# Patient Record
Sex: MALE | Race: WHITE | HISPANIC OR LATINO | Employment: OTHER | URBAN - METROPOLITAN AREA
[De-identification: names, ages, dates, MRNs, and addresses within clinical notes are randomized per-mention and may not be internally consistent; named-entity substitution may affect disease eponyms.]

---

## 2018-09-14 ENCOUNTER — APPOINTMENT (OUTPATIENT)
Dept: RADIOLOGY | Facility: MEDICAL CENTER | Age: 78
End: 2018-09-14
Attending: EMERGENCY MEDICINE
Payer: MEDICARE

## 2018-09-14 ENCOUNTER — HOSPITAL ENCOUNTER (EMERGENCY)
Facility: MEDICAL CENTER | Age: 78
End: 2018-09-14
Attending: EMERGENCY MEDICINE
Payer: MEDICARE

## 2018-09-14 VITALS
HEIGHT: 66 IN | HEART RATE: 80 BPM | SYSTOLIC BLOOD PRESSURE: 159 MMHG | BODY MASS INDEX: 24.66 KG/M2 | DIASTOLIC BLOOD PRESSURE: 98 MMHG | OXYGEN SATURATION: 97 % | TEMPERATURE: 98.3 F | RESPIRATION RATE: 16 BRPM | WEIGHT: 153.44 LBS

## 2018-09-14 DIAGNOSIS — K62.89 RECTAL PAIN: ICD-10-CM

## 2018-09-14 DIAGNOSIS — R10.32 LLQ ABDOMINAL PAIN: ICD-10-CM

## 2018-09-14 LAB
ANION GAP SERPL CALC-SCNC: 7 MMOL/L (ref 0–11.9)
APPEARANCE UR: CLEAR
BACTERIA #/AREA URNS HPF: NEGATIVE /HPF
BASOPHILS # BLD AUTO: 0.9 % (ref 0–1.8)
BASOPHILS # BLD: 0.06 K/UL (ref 0–0.12)
BILIRUB UR QL STRIP.AUTO: NEGATIVE
BUN SERPL-MCNC: 17 MG/DL (ref 8–22)
CALCIUM SERPL-MCNC: 9.3 MG/DL (ref 8.5–10.5)
CHLORIDE SERPL-SCNC: 104 MMOL/L (ref 96–112)
CO2 SERPL-SCNC: 25 MMOL/L (ref 20–33)
COLOR UR: YELLOW
CREAT SERPL-MCNC: 1.03 MG/DL (ref 0.5–1.4)
EOSINOPHIL # BLD AUTO: 0.3 K/UL (ref 0–0.51)
EOSINOPHIL NFR BLD: 4.5 % (ref 0–6.9)
EPI CELLS #/AREA URNS HPF: NEGATIVE /HPF
ERYTHROCYTE [DISTWIDTH] IN BLOOD BY AUTOMATED COUNT: 40.6 FL (ref 35.9–50)
GLUCOSE SERPL-MCNC: 87 MG/DL (ref 65–99)
GLUCOSE UR STRIP.AUTO-MCNC: NEGATIVE MG/DL
HCT VFR BLD AUTO: 45.3 % (ref 42–52)
HGB BLD-MCNC: 15.3 G/DL (ref 14–18)
HYALINE CASTS #/AREA URNS LPF: ABNORMAL /LPF
IMM GRANULOCYTES # BLD AUTO: 0.03 K/UL (ref 0–0.11)
IMM GRANULOCYTES NFR BLD AUTO: 0.4 % (ref 0–0.9)
KETONES UR STRIP.AUTO-MCNC: NEGATIVE MG/DL
LEUKOCYTE ESTERASE UR QL STRIP.AUTO: NEGATIVE
LYMPHOCYTES # BLD AUTO: 1.34 K/UL (ref 1–4.8)
LYMPHOCYTES NFR BLD: 20 % (ref 22–41)
MCH RBC QN AUTO: 29.3 PG (ref 27–33)
MCHC RBC AUTO-ENTMCNC: 33.8 G/DL (ref 33.7–35.3)
MCV RBC AUTO: 86.6 FL (ref 81.4–97.8)
MICRO URNS: ABNORMAL
MONOCYTES # BLD AUTO: 0.85 K/UL (ref 0–0.85)
MONOCYTES NFR BLD AUTO: 12.7 % (ref 0–13.4)
NEUTROPHILS # BLD AUTO: 4.12 K/UL (ref 1.82–7.42)
NEUTROPHILS NFR BLD: 61.5 % (ref 44–72)
NITRITE UR QL STRIP.AUTO: NEGATIVE
NRBC # BLD AUTO: 0 K/UL
NRBC BLD-RTO: 0 /100 WBC
PH UR STRIP.AUTO: 6 [PH]
PLATELET # BLD AUTO: 308 K/UL (ref 164–446)
PMV BLD AUTO: 9.2 FL (ref 9–12.9)
POTASSIUM SERPL-SCNC: 3.9 MMOL/L (ref 3.6–5.5)
PROT UR QL STRIP: 30 MG/DL
RBC # BLD AUTO: 5.23 M/UL (ref 4.7–6.1)
RBC # URNS HPF: ABNORMAL /HPF
RBC UR QL AUTO: NEGATIVE
SODIUM SERPL-SCNC: 136 MMOL/L (ref 135–145)
SP GR UR STRIP.AUTO: 1.01
UROBILINOGEN UR STRIP.AUTO-MCNC: 0.2 MG/DL
WBC # BLD AUTO: 6.7 K/UL (ref 4.8–10.8)
WBC #/AREA URNS HPF: ABNORMAL /HPF

## 2018-09-14 PROCEDURE — 96375 TX/PRO/DX INJ NEW DRUG ADDON: CPT

## 2018-09-14 PROCEDURE — 99285 EMERGENCY DEPT VISIT HI MDM: CPT

## 2018-09-14 PROCEDURE — 81001 URINALYSIS AUTO W/SCOPE: CPT

## 2018-09-14 PROCEDURE — 96374 THER/PROPH/DIAG INJ IV PUSH: CPT | Mod: XU

## 2018-09-14 PROCEDURE — 80048 BASIC METABOLIC PNL TOTAL CA: CPT

## 2018-09-14 PROCEDURE — 700117 HCHG RX CONTRAST REV CODE 255: Performed by: EMERGENCY MEDICINE

## 2018-09-14 PROCEDURE — 36415 COLL VENOUS BLD VENIPUNCTURE: CPT

## 2018-09-14 PROCEDURE — 85025 COMPLETE CBC W/AUTO DIFF WBC: CPT

## 2018-09-14 PROCEDURE — 700111 HCHG RX REV CODE 636 W/ 250 OVERRIDE (IP): Performed by: EMERGENCY MEDICINE

## 2018-09-14 PROCEDURE — 74177 CT ABD & PELVIS W/CONTRAST: CPT

## 2018-09-14 RX ORDER — HYDROCODONE BITARTRATE AND ACETAMINOPHEN 5; 325 MG/1; MG/1
1-2 TABLET ORAL EVERY 4 HOURS PRN
Qty: 10 TAB | Refills: 0 | Status: SHIPPED | OUTPATIENT
Start: 2018-09-14 | End: 2018-09-16

## 2018-09-14 RX ORDER — ONDANSETRON 2 MG/ML
4 INJECTION INTRAMUSCULAR; INTRAVENOUS
Status: DISCONTINUED | OUTPATIENT
Start: 2018-09-14 | End: 2018-09-14 | Stop reason: HOSPADM

## 2018-09-14 RX ORDER — LISINOPRIL 10 MG/1
10 TABLET ORAL DAILY
COMMUNITY
End: 2018-09-25

## 2018-09-14 RX ORDER — MORPHINE SULFATE 4 MG/ML
4 INJECTION, SOLUTION INTRAMUSCULAR; INTRAVENOUS ONCE
Status: COMPLETED | OUTPATIENT
Start: 2018-09-14 | End: 2018-09-14

## 2018-09-14 RX ORDER — POLYETHYLENE GLYCOL 3350 17 G/17G
17 POWDER, FOR SOLUTION ORAL DAILY
Qty: 1 BOTTLE | Refills: 0 | Status: SHIPPED | OUTPATIENT
Start: 2018-09-14

## 2018-09-14 RX ORDER — ALLOPURINOL 100 MG/1
100 TABLET ORAL DAILY
COMMUNITY

## 2018-09-14 RX ADMIN — ONDANSETRON 4 MG: 2 INJECTION INTRAMUSCULAR; INTRAVENOUS at 17:25

## 2018-09-14 RX ADMIN — IOHEXOL 100 ML: 350 INJECTION, SOLUTION INTRAVENOUS at 19:24

## 2018-09-14 RX ADMIN — MORPHINE SULFATE 4 MG: 4 INJECTION INTRAVENOUS at 17:25

## 2018-09-14 ASSESSMENT — PAIN SCALES - GENERAL
PAINLEVEL_OUTOF10: 4
PAINLEVEL_OUTOF10: 0

## 2018-09-14 NOTE — ED PROVIDER NOTES
"ED Provider Note    CHIEF COMPLAINT  Chief Complaint   Patient presents with   • Rectal Pain   • Groin Pain       HPI  Martinez Patrick is a 77 y.o. male who presents for 2-3 days of rectal pain that he feels on the perineum.  Pain is variable from mild to severe.  He also has some mild left groin pain and mild epigastric pain.  No fever dysuria urgency or frequency.  No diabetes or immune compromise.  History of prostatism and urinary retention perhaps status post a TURP.  Denies urinary retention now.     REVIEW OF SYSTEMS  Pertinent positives include: Rectal or perineal pain, left groin pain, epigastric pain.  Pertinent negatives include: Fever, nausea, vomiting, diarrhea, constipation, hernia, abscess.  10+ systems reviewed and negative    PAST MEDICAL HISTORY  Past Medical History:   Diagnosis Date   • Gout    • Hypertension        SOCIAL HISTORY  Here with wife and son.    CURRENT MEDICATIONS  Home Medications     Reviewed by Melissa Rosas R.N. (Registered Nurse) on 09/14/18 at 1532  Med List Status: Complete   Medication Last Dose Status   allopurinol (ZYLOPRIM) 100 MG Tab  Active   lisinopril (PRINIVIL) 10 MG Tab  Active                ALLERGIES  No Known Allergies    PHYSICAL EXAM  VITAL SIGNS: BP (!) 176/108   Pulse 86   Temp 36.8 °C (98.3 °F) (Temporal)   Resp 16   Ht 1.676 m (5' 6\")   Wt 69.6 kg (153 lb 7 oz)   SpO2 96%   BMI 24.77 kg/m²   Constitutional :  Well developed, Well nourished, elevated blood pressure, afebrile, well-appearing.   HNT: Oropharynx moist.    Lymphatic: No inguinal adenopathy.   Cardiovascular: Regular rhythm, No murmurs, No rubs, No gallops.  No cyanosis.   Respiratory: No rales, rhonchi, wheeze  Abdomen:  Soft, nontender, minimal left groin tenderness without hernia, testicular tenderness nor epididymal tenderness.  No perianal mass or tenderness.  Large prostate, brown stool Hemoccult negative, no evidence of perirectal abscess  Skin: Warm, dry, no erythema, no rash. "   Musculoskeletal: no limb deformities.    RADIOLOGY:  CT-ABDOMEN-PELVIS WITH   Final Result         1. No acute abnormality in the abdomen or pelvis.   2. Colonic diverticulosis.   3. Small bilateral testicular hydroceles. Prostatomegaly.   4. Mild bilateral renal atrophy and nodularity of the renal cortices, likely related to chronic medical renal disease.          LABORATORY:  Results for orders placed or performed during the hospital encounter of 09/14/18   URINALYSIS,CULTURE IF INDICATED   Result Value Ref Range    Color Yellow     Character Clear     Specific Gravity 1.013 <1.035    Ph 6.0 5.0 - 8.0    Glucose Negative Negative mg/dL    Ketones Negative Negative mg/dL    Protein 30 (A) Negative mg/dL    Bilirubin Negative Negative    Urobilinogen, Urine 0.2 Negative    Nitrite Negative Negative    Leukocyte Esterase Negative Negative    Occult Blood Negative Negative    Micro Urine Req Microscopic    URINE MICROSCOPIC (W/UA)   Result Value Ref Range    WBC 0-2 (A) /hpf    RBC 2-5 (A) /hpf    Bacteria Negative None /hpf    Epithelial Cells Negative /hpf    Hyaline Cast 0-2 /lpf   CBC WITH DIFFERENTIAL   Result Value Ref Range    WBC 6.7 4.8 - 10.8 K/uL    RBC 5.23 4.70 - 6.10 M/uL    Hemoglobin 15.3 14.0 - 18.0 g/dL    Hematocrit 45.3 42.0 - 52.0 %    MCV 86.6 81.4 - 97.8 fL    MCH 29.3 27.0 - 33.0 pg    MCHC 33.8 33.7 - 35.3 g/dL    RDW 40.6 35.9 - 50.0 fL    Platelet Count 308 164 - 446 K/uL    MPV 9.2 9.0 - 12.9 fL    Neutrophils-Polys 61.50 44.00 - 72.00 %    Lymphocytes 20.00 (L) 22.00 - 41.00 %    Monocytes 12.70 0.00 - 13.40 %    Eosinophils 4.50 0.00 - 6.90 %    Basophils 0.90 0.00 - 1.80 %    Immature Granulocytes 0.40 0.00 - 0.90 %    Nucleated RBC 0.00 /100 WBC    Neutrophils (Absolute) 4.12 1.82 - 7.42 K/uL    Lymphs (Absolute) 1.34 1.00 - 4.80 K/uL    Monos (Absolute) 0.85 0.00 - 0.85 K/uL    Eos (Absolute) 0.30 0.00 - 0.51 K/uL    Baso (Absolute) 0.06 0.00 - 0.12 K/uL    Immature Granulocytes  (abs) 0.03 0.00 - 0.11 K/uL    NRBC (Absolute) 0.00 K/uL   BASIC METABOLIC PANEL   Result Value Ref Range    Sodium 136 135 - 145 mmol/L    Potassium 3.9 3.6 - 5.5 mmol/L    Chloride 104 96 - 112 mmol/L    Co2 25 20 - 33 mmol/L    Glucose 87 65 - 99 mg/dL    Bun 17 8 - 22 mg/dL    Creatinine 1.03 0.50 - 1.40 mg/dL    Calcium 9.3 8.5 - 10.5 mg/dL    Anion Gap 7.0 0.0 - 11.9         COURSE & MEDICAL DECISION MAKING  Well-appearing patient presents with left lower quadrant abdominal pain and perirectal or perineal pain.  There is no evidence of prostatitis, urinary retention, inguinal hernia, testicular pathology or diverticulitis although the patient does have diverticulosis.  He may have constipation.  There is no evidence of aortoiliac disease or mesenteric ischemia.  There is no perirectal or perianal abscess.    PLAN:  Discharge Medication List as of 9/14/2018  8:25 PM      START taking these medications    Details   polyethylene glycol 3350 (MIRALAX) Powder Take 17 g by mouth every day. Mixed in 1 cup water, Disp-1 Bottle, R-0, Print Rx Paper      HYDROcodone-acetaminophen (NORCO) 5-325 MG Tab per tablet Take 1-2 Tabs by mouth every four hours as needed (mild pain) for up to 2 days., Disp-10 Tab, R-0, Print Rx Paper, For 2 days           Prescription monitoring queried and score low  Opiate risk tool utilized and patient low risk  Informed consent obtained for opiate analgesic  Indication opiate analgesic pain and not controlled by ibuprofen    Abdominal pain handout given  Return for fever, worsening pain, GI bleed, ill appearance    [address] [address]  [phone]  Schedule an appointment as soon as possible for a visit in 1 week  As needed si no mejora        CONDITION:  Good.    FINAL IMPRESSION:  1. LLQ abdominal pain    2. Rectal pain          Electronically signed by: Dinh Bone, 9/14/2018

## 2018-09-14 NOTE — ED TRIAGE NOTES
Pt ambulatory to triage. Pt c/o pain near rectum., that is worse when seated. Pt also c/o left groin pain. Denies any blood with defecation. Sx began 3 pain. Pt denies lifting anything heavy or doing anything abnormal.    Chief Complaint   Patient presents with   • Rectal Pain   • Groin Pain     Pt placed in lobby, updated on triage process. Pt educated to notified RN or triage tech if changes in condition.

## 2018-09-15 NOTE — ED NOTES
D/c instructions and rxs reviewed with pt. Pt verbalized understanding. Pt in nad at time of d/c. Pt denies pain at time of d/c. Pt ambulatory out of department with family

## 2018-09-15 NOTE — DISCHARGE INSTRUCTIONS
Diverticulosis  (Diverticulosis)  No sabemos porque tiene dolor bita no hay senale de problema grave.  Leonela miralax si esta estrenido y para prevenir estrenimiento si leonela hydrocodone contra dolor.  Vaya a [facility] clinic si no mejora la semana que viene.  Regresa para fiebre, dolor roosevelt, o si parece enfermo.    You had a borderline or high normal blood pressure reading today.  This does not necessarily mean you have hypertension.  Please followup with your/a primary physician for comprehensive blood pressure evaluation and yearly fasting cholesterol assessment.  BP Readings from Last 3 Encounters:   09/14/18 (!) 176/108           La diverticulosis es christina enfermedad que aparece cuando se delma pequeños bolsillos (divertículos) en las carson del colon. El colon, o intestino grueso, es el lugar donde se absorbe agua y se delma las heces. Los bolsillos se delma cuando la capa interna del colon ejerce presión sobre los puntos débiles de las capas externas.  CAUSAS  Nadie sabe con exactitud qué causa la diverticulosis.  FACTORES DE RIESGO  · Ser mayor de 50 años. El riesgo de desarrollar esta enfermedad aumenta con la edad. La diverticulosis es poco frecuente en las personas menores de 40 años. A los 80 años, burke todas las personas tienen la enfermedad.  · [place] christina dieta con bajo contenido de fibra.  · Estar estreñido con frecuencia.  · Tener sobrepeso.  · No hacer suficiente ejercicio físico.  · Fumar.  · Tanya analgésicos de venta lilian, agnieszka aspirina e ibuprofeno.  SÍNTOMAS  La mayoría de las personas que tienen diverticulosis no presentan síntomas.  DIAGNÓSTICO  Dado que la diverticulosis no suele causar síntomas, los médicos a menudo descubren la enfermedad tammi un examen de otros problemas de colon. En muchos casos, el médico diagnosticará la diverticulosis mientras utiliza un endoscopio flexible para examinar el colon (colonoscopía).  TRATAMIENTO  Si nunca tuvo christina infección relacionada con la  diverticulosis, es posible que no necesite tratamiento. Si ha tenido christina infección antes, el tratamiento puede incluir:  · [place] más frutas, verduras y cereales.  · Tanya un suplemento de fibra.  · Tanya un suplemento de bacterias laly (probiótico).  · Tanya medicamentos para relajar el colon.  INSTRUCCIONES PARA EL CUIDADO EN EL HOGAR  · [place] por lo menos entre 6 y 8 vasos de agua por día para prevenir el estreñimiento.  · Trate de no hacer fuerza al  el intestino.  · Cumpla con todas las visitas de control.  Si ha tenido christina infección antes:  · Aumente la cantidad de fibra en la dieta, según las indicaciones del médico o del nutricionista.  · [place] un suplemento dietario con fibras si el médico lo autoriza.  · [place] los medicamentos solamente agnieszka se lo haya indicado el médico.  SOLICITE ATENCIÓN MÉDICA SI:  · Siente dolor abdominal.  · Tiene meteorismo.  · Tiene cólicos.  · No ha defecado en 3 susie.  SOLICITE ATENCIÓN MÉDICA DE INMEDIATO SI:  · El dolor empeora.  · El meteorismo empeora mucho.  · Tiene fiebre o escalofríos, y los síntomas empeoran repentinamente.  · Comienza a vomitar.  · La materia fecal es sanguinolenta o marysol.  ASEGÚRESE DE QUE:  · Comprende estas instrucciones.  · Controlará vicente afección.  · Recibirá ayuda de inmediato si no mejora o si empeora.  Esta información no tiene agnieszka fin reemplazar el consejo del médico. Asegúrese de hacerle al médico cualquier pregunta que tenga.  Document Released: 11/30/2009 Document Revised: 12/23/2014 Document Reviewed: 11/12/2014  Elsevier Interactive Patient Education © 2017 [employer]

## 2018-09-25 ENCOUNTER — HOSPITAL ENCOUNTER (EMERGENCY)
Facility: MEDICAL CENTER | Age: 78
End: 2018-09-25
Attending: EMERGENCY MEDICINE
Payer: MEDICARE

## 2018-09-25 ENCOUNTER — OFFICE VISIT (OUTPATIENT)
Dept: URGENT CARE | Facility: CLINIC | Age: 78
End: 2018-09-25
Payer: MEDICARE

## 2018-09-25 VITALS
DIASTOLIC BLOOD PRESSURE: 100 MMHG | BODY MASS INDEX: 25.61 KG/M2 | SYSTOLIC BLOOD PRESSURE: 202 MMHG | WEIGHT: 150 LBS | RESPIRATION RATE: 16 BRPM | HEIGHT: 64 IN | OXYGEN SATURATION: 96 % | TEMPERATURE: 98.5 F | HEART RATE: 63 BPM

## 2018-09-25 VITALS
BODY MASS INDEX: 25.93 KG/M2 | SYSTOLIC BLOOD PRESSURE: 169 MMHG | TEMPERATURE: 98.5 F | DIASTOLIC BLOOD PRESSURE: 92 MMHG | HEIGHT: 64 IN | HEART RATE: 64 BPM | WEIGHT: 151.9 LBS | RESPIRATION RATE: 16 BRPM | OXYGEN SATURATION: 98 %

## 2018-09-25 DIAGNOSIS — R10.32 LEFT LOWER QUADRANT PAIN: ICD-10-CM

## 2018-09-25 DIAGNOSIS — R10.32 LLQ ABDOMINAL PAIN: ICD-10-CM

## 2018-09-25 DIAGNOSIS — I10 MALIGNANT HYPERTENSION: ICD-10-CM

## 2018-09-25 DIAGNOSIS — I10 HYPERTENSION, UNSPECIFIED TYPE: ICD-10-CM

## 2018-09-25 LAB
ALBUMIN SERPL BCP-MCNC: 4.6 G/DL (ref 3.2–4.9)
ALBUMIN/GLOB SERPL: 1.3 G/DL
ALP SERPL-CCNC: 96 U/L (ref 30–99)
ALT SERPL-CCNC: 11 U/L (ref 2–50)
ANION GAP SERPL CALC-SCNC: 8 MMOL/L (ref 0–11.9)
APPEARANCE UR: CLEAR
AST SERPL-CCNC: 20 U/L (ref 12–45)
BACTERIA #/AREA URNS HPF: NEGATIVE /HPF
BASOPHILS # BLD AUTO: 0.7 % (ref 0–1.8)
BASOPHILS # BLD: 0.06 K/UL (ref 0–0.12)
BILIRUB SERPL-MCNC: 0.5 MG/DL (ref 0.1–1.5)
BILIRUB UR QL STRIP.AUTO: NEGATIVE
BUN SERPL-MCNC: 16 MG/DL (ref 8–22)
CALCIUM SERPL-MCNC: 10.3 MG/DL (ref 8.5–10.5)
CHLORIDE SERPL-SCNC: 106 MMOL/L (ref 96–112)
CO2 SERPL-SCNC: 26 MMOL/L (ref 20–33)
COLOR UR: YELLOW
CREAT SERPL-MCNC: 1.15 MG/DL (ref 0.5–1.4)
EKG IMPRESSION: NORMAL
EOSINOPHIL # BLD AUTO: 0.19 K/UL (ref 0–0.51)
EOSINOPHIL NFR BLD: 2.3 % (ref 0–6.9)
EPI CELLS #/AREA URNS HPF: NEGATIVE /HPF
ERYTHROCYTE [DISTWIDTH] IN BLOOD BY AUTOMATED COUNT: 41.2 FL (ref 35.9–50)
GLOBULIN SER CALC-MCNC: 3.6 G/DL (ref 1.9–3.5)
GLUCOSE SERPL-MCNC: 120 MG/DL (ref 65–99)
GLUCOSE UR STRIP.AUTO-MCNC: NEGATIVE MG/DL
HCT VFR BLD AUTO: 47.2 % (ref 42–52)
HGB BLD-MCNC: 15.9 G/DL (ref 14–18)
HYALINE CASTS #/AREA URNS LPF: ABNORMAL /LPF
IMM GRANULOCYTES # BLD AUTO: 0.04 K/UL (ref 0–0.11)
IMM GRANULOCYTES NFR BLD AUTO: 0.5 % (ref 0–0.9)
KETONES UR STRIP.AUTO-MCNC: NEGATIVE MG/DL
LEUKOCYTE ESTERASE UR QL STRIP.AUTO: NEGATIVE
LIPASE SERPL-CCNC: 43 U/L (ref 11–82)
LYMPHOCYTES # BLD AUTO: 0.8 K/UL (ref 1–4.8)
LYMPHOCYTES NFR BLD: 9.8 % (ref 22–41)
MCH RBC QN AUTO: 29.4 PG (ref 27–33)
MCHC RBC AUTO-ENTMCNC: 33.7 G/DL (ref 33.7–35.3)
MCV RBC AUTO: 87.2 FL (ref 81.4–97.8)
MICRO URNS: ABNORMAL
MONOCYTES # BLD AUTO: 0.79 K/UL (ref 0–0.85)
MONOCYTES NFR BLD AUTO: 9.7 % (ref 0–13.4)
NEUTROPHILS # BLD AUTO: 6.27 K/UL (ref 1.82–7.42)
NEUTROPHILS NFR BLD: 77 % (ref 44–72)
NITRITE UR QL STRIP.AUTO: NEGATIVE
NRBC # BLD AUTO: 0 K/UL
NRBC BLD-RTO: 0 /100 WBC
PH UR STRIP.AUTO: 5.5 [PH]
PLATELET # BLD AUTO: 308 K/UL (ref 164–446)
PMV BLD AUTO: 9.4 FL (ref 9–12.9)
POTASSIUM SERPL-SCNC: 4.2 MMOL/L (ref 3.6–5.5)
PROT SERPL-MCNC: 8.2 G/DL (ref 6–8.2)
PROT UR QL STRIP: 100 MG/DL
RBC # BLD AUTO: 5.41 M/UL (ref 4.7–6.1)
RBC # URNS HPF: ABNORMAL /HPF
RBC UR QL AUTO: NEGATIVE
SODIUM SERPL-SCNC: 140 MMOL/L (ref 135–145)
SP GR UR STRIP.AUTO: 1.01
UROBILINOGEN UR STRIP.AUTO-MCNC: 0.2 MG/DL
WBC # BLD AUTO: 8.2 K/UL (ref 4.8–10.8)
WBC #/AREA URNS HPF: ABNORMAL /HPF

## 2018-09-25 PROCEDURE — 83690 ASSAY OF LIPASE: CPT

## 2018-09-25 PROCEDURE — 700101 HCHG RX REV CODE 250: Performed by: EMERGENCY MEDICINE

## 2018-09-25 PROCEDURE — 99285 EMERGENCY DEPT VISIT HI MDM: CPT

## 2018-09-25 PROCEDURE — 96374 THER/PROPH/DIAG INJ IV PUSH: CPT

## 2018-09-25 PROCEDURE — 85025 COMPLETE CBC W/AUTO DIFF WBC: CPT

## 2018-09-25 PROCEDURE — 81001 URINALYSIS AUTO W/SCOPE: CPT

## 2018-09-25 PROCEDURE — 93005 ELECTROCARDIOGRAM TRACING: CPT

## 2018-09-25 PROCEDURE — 99205 OFFICE O/P NEW HI 60 MIN: CPT | Performed by: PHYSICIAN ASSISTANT

## 2018-09-25 PROCEDURE — 36415 COLL VENOUS BLD VENIPUNCTURE: CPT

## 2018-09-25 PROCEDURE — 93005 ELECTROCARDIOGRAM TRACING: CPT | Performed by: EMERGENCY MEDICINE

## 2018-09-25 PROCEDURE — 80053 COMPREHEN METABOLIC PANEL: CPT

## 2018-09-25 RX ORDER — HYDROCODONE BITARTRATE AND ACETAMINOPHEN 5; 325 MG/1; MG/1
1-2 TABLET ORAL EVERY 4 HOURS PRN
COMMUNITY

## 2018-09-25 RX ORDER — LABETALOL HYDROCHLORIDE 5 MG/ML
20 INJECTION, SOLUTION INTRAVENOUS ONCE
Status: COMPLETED | OUTPATIENT
Start: 2018-09-25 | End: 2018-09-25

## 2018-09-25 RX ORDER — LISINOPRIL 10 MG/1
10 TABLET ORAL DAILY
Qty: 30 TAB | Refills: 3 | Status: SHIPPED | OUTPATIENT
Start: 2018-09-25

## 2018-09-25 RX ADMIN — LABETALOL HYDROCHLORIDE 20 MG: 5 INJECTION, SOLUTION INTRAVENOUS at 14:00

## 2018-09-25 ASSESSMENT — PAIN SCALES - GENERAL
PAINLEVEL_OUTOF10: 10
PAINLEVEL_OUTOF10: 10
PAINLEVEL_OUTOF10: 5

## 2018-09-25 NOTE — ED NOTES
Martinez Patrick discharged via ambulation with son.  Discharge instructions given and reviewed, patient educated to follow up with GI and PCP, verbalized understanding.  Prescriptions given x 1.  All personal belongings in possession.  No questions at this time.

## 2018-09-25 NOTE — ED PROVIDER NOTES
ED Provider Note    Scribed for Dr. Wojciech Burton M.D. by Indio Banks. 9/25/2018  12:44 PM    Means of arrival: Walk-in  History obtained from: Patient  History limited by: None    CHIEF COMPLAINT  Chief Complaint   Patient presents with   • LLQ Pain     radiates upward; pt seen for same on 9/14, states he doesn't know what he was diagnosed with, diverticulosis seen on last visit   • Groin Pain     L sided   • Blood Pressure Problem       HPI  Martinez Patrick is a 77 y.o. male who presents to the Emergency Department complaining of left lower abdominal pain that began about one year ago. The pain is intermittent. He describes this pain as a hot wave of pain. Patient last passed a normal bowel movement yesterday evening. He denies any pain when passing bowel movements. Patient experiences some chronic constipation, but this is well managed with Miralax that was prescribed during his visit on 09/14/2018. His pain today is similar to the pain at that time. The patient reports associated rectal pain. He was seen at Urgent Care and referred here for an elevated systolic blood pressure in the 220s. Patient has not taken his lisinopril for the last month. He denies vomiting or diarrhea.  The patient was seen in urgent care today and referred here because of a significantly elevated blood pressure, but this seems to be secondary to noncompliance.    REVIEW OF SYSTEMS  Pertinent positives include abdominal pain, rectal pain, and hypertension. Pertinent negatives include no vomiting or diarrhea. As above, all other systems reviewed and are negative.   See HPI for further details.     PAST MEDICAL HISTORY   has a past medical history of Gout and Hypertension.    SURGICAL HISTORY  patient denies any surgical history    SOCIAL HISTORY  Social History   Substance Use Topics   • Smoking status: Never Smoker   • Smokeless tobacco: Never Used   • Alcohol use No      History   Drug Use No       FAMILY HISTORY  History  "reviewed. No pertinent family history.    CURRENT MEDICATIONS  Home Medications     Reviewed by Thuy Darling R.N. (Registered Nurse) on 09/25/18 at 1242  Med List Status: Complete   Medication Last Dose Status   allopurinol (ZYLOPRIM) 100 MG Tab not taking Active   HYDROcodone-acetaminophen (NORCO) 5-325 MG Tab per tablet 9/25/2018 Active   lisinopril (PRINIVIL) 10 MG Tab 8/28/2018 Active   polyethylene glycol 3350 (MIRALAX) Powder  Active                ALLERGIES  No Known Allergies    PHYSICAL EXAM  VITAL SIGNS: BP (!) 200/100   Pulse 65   Temp 36.9 °C (98.5 °F)   Resp 16   Ht 1.626 m (5' 4\")   Wt 68.9 kg (151 lb 14.4 oz)   SpO2 97%   BMI 26.07 kg/m²     Constitutional: Well developed, Well nourished, No acute distress, Non-toxic appearance.   HENT: Normocephalic, Atraumatic, Bilateral external ears normal, Oropharynx moist, No oral exudates.   Eyes: PERRLA, EOMI, Conjunctiva normal, No discharge.   Neck: No tenderness, Supple, No stridor.   Lymphatic: No lymphadenopathy noted.   Cardiovascular: Normal heart rate, Normal rhythm.   Thorax & Lungs: Clear to auscultation bilaterally, No respiratory distress, No wheezing, No crackles.   Abdomen: Soft, Left lower quadrant tenderness, No masses, No pulsatile masses.  Skin: Warm, Dry, No erythema, No rash.   Extremities:, No edema No cyanosis.   Musculoskeletal: No tenderness to palpation or major deformities noted.  Intact distal pulses  Neurologic: Awake, alert. Moves all extremities spontaneously.  Psychiatric: Affect normal, Judgment normal, Mood normal.     LABS  Results for orders placed or performed during the hospital encounter of 09/25/18   CBC WITH DIFFERENTIAL   Result Value Ref Range    WBC 8.2 4.8 - 10.8 K/uL    RBC 5.41 4.70 - 6.10 M/uL    Hemoglobin 15.9 14.0 - 18.0 g/dL    Hematocrit 47.2 42.0 - 52.0 %    MCV 87.2 81.4 - 97.8 fL    MCH 29.4 27.0 - 33.0 pg    MCHC 33.7 33.7 - 35.3 g/dL    RDW 41.2 35.9 - 50.0 fL    Platelet Count 308 164 - " 446 K/uL    MPV 9.4 9.0 - 12.9 fL    Neutrophils-Polys 77.00 (H) 44.00 - 72.00 %    Lymphocytes 9.80 (L) 22.00 - 41.00 %    Monocytes 9.70 0.00 - 13.40 %    Eosinophils 2.30 0.00 - 6.90 %    Basophils 0.70 0.00 - 1.80 %    Immature Granulocytes 0.50 0.00 - 0.90 %    Nucleated RBC 0.00 /100 WBC    Neutrophils (Absolute) 6.27 1.82 - 7.42 K/uL    Lymphs (Absolute) 0.80 (L) 1.00 - 4.80 K/uL    Monos (Absolute) 0.79 0.00 - 0.85 K/uL    Eos (Absolute) 0.19 0.00 - 0.51 K/uL    Baso (Absolute) 0.06 0.00 - 0.12 K/uL    Immature Granulocytes (abs) 0.04 0.00 - 0.11 K/uL    NRBC (Absolute) 0.00 K/uL   COMP METABOLIC PANEL   Result Value Ref Range    Sodium 140 135 - 145 mmol/L    Potassium 4.2 3.6 - 5.5 mmol/L    Chloride 106 96 - 112 mmol/L    Co2 26 20 - 33 mmol/L    Anion Gap 8.0 0.0 - 11.9    Glucose 120 (H) 65 - 99 mg/dL    Bun 16 8 - 22 mg/dL    Creatinine 1.15 0.50 - 1.40 mg/dL    Calcium 10.3 8.5 - 10.5 mg/dL    AST(SGOT) 20 12 - 45 U/L    ALT(SGPT) 11 2 - 50 U/L    Alkaline Phosphatase 96 30 - 99 U/L    Total Bilirubin 0.5 0.1 - 1.5 mg/dL    Albumin 4.6 3.2 - 4.9 g/dL    Total Protein 8.2 6.0 - 8.2 g/dL    Globulin 3.6 (H) 1.9 - 3.5 g/dL    A-G Ratio 1.3 g/dL   LIPASE   Result Value Ref Range    Lipase 43 11 - 82 U/L   URINALYSIS,CULTURE IF INDICATED   Result Value Ref Range    Color Yellow     Character Clear     Specific Gravity 1.014 <1.035    Ph 5.5 5.0 - 8.0    Glucose Negative Negative mg/dL    Ketones Negative Negative mg/dL    Protein 100 (A) Negative mg/dL    Bilirubin Negative Negative    Urobilinogen, Urine 0.2 Negative    Nitrite Negative Negative    Leukocyte Esterase Negative Negative    Occult Blood Negative Negative    Micro Urine Req Microscopic    ESTIMATED GFR   Result Value Ref Range    GFR If African American >60 >60 mL/min/1.73 m 2    GFR If Non African American >60 >60 mL/min/1.73 m 2   URINE MICROSCOPIC (W/UA)   Result Value Ref Range    WBC 0-2 (A) /hpf    RBC 0-2 (A) /hpf    Bacteria Negative  None /hpf    Epithelial Cells Negative /hpf    Hyaline Cast 0-2 /lpf   EKG (NOW)   Result Value Ref Range    Report       [facility] Emergency Dept.    Test Date:  2018  Pt Name:    PRACHI DE LA CRUZ                 Department: ER  MRN:        [id number]                      Room:  Gender:     Male                         Technician: [id number]  :        [id number]                   Requested By:ER TRIAGE PROTOCOL  Order #:    [id number]                    Reading MD:    Measurements  Intervals                                Axis  Rate:       65                           P:          10  OK:         184                          QRS:        25  QRSD:       100                          T:          34  QT:         404  QTc:        421    Interpretive Statements  SINUS RHYTHM  RSR' IN V1 OR V2, RIGHT VCD OR RVH  No previous ECG available for comparison         All labs reviewed by me.    EKG  Interpreted by me, as above.     COURSE & MEDICAL DECISION MAKING  Pertinent Labs & Imaging studies reviewed. (See chart for details)    12:44 PM - Patient seen and examined at bedside. Patient will be treated with labetalol injection 20 mg. Ordered urine microscopic with U/A, estimated GFR, CBC with differential, CMP, Lipase, U/A culture if indicated, and EKG to evaluate his symptoms. The differential diagnoses include but are not limited to: Constipation, diverticulitis, UTI    1:45 PM Recheck: Patient re-evaluated at beside. Patient reports feeling the same. I informed the patient he would require a rectal exam, but he declined. Patient's lab results discussed. The patient understood and is in agreement.     2:11 PM Nursing informed me that he is experiencing 10/10 pain, but states he is fine upon my reevaluation.    3:20 PM - Re-examined; The patient is resting in bed, but reports he is still experiencing pain. His blood pressure is 160/92. I discussed his above findings and plans for discharge with a prescription  for Prinivil. He was given a referral [facility] and instructed to return to the ED if his symptoms worsen. Patient understands and agrees.    Decision Making:  Patient with long-term abdominal pain that may or may not be secondary to constipation I do not feel further imaging needed today I did repeat his labs are unremarkable.  Patient's exam is benign.  He is quite hypertensive, was treated with labetalol with significant reduction in blood pressure and I will restart him on the was previously taking    The patient will return for new or worsening symptoms and is stable at the time of discharge.    The patient is referred to a primary physician for blood pressure management, diabetic screening, and for all other preventative health concerns.    DISPOSITION:  Patient will be discharged home in stable condition.    FOLLOW UP:  [facility]  [address] [place] [id number] [phone]          OUTPATIENT MEDICATIONS:  New Prescriptions    LISINOPRIL (PRINIVIL) 10 MG TAB    Take 1 Tab by mouth every day.       FINAL IMPRESSION  1. Left lower quadrant pain    2. Hypertension, unspecified type          I, Indio Banks (Jose Alberto), am scribing for, and in the presence of, Wojciech Burton M.D..    Electronically signed by: Indio Banks (Jose Alberto), 9/25/2018    IWojciech M.D. personally performed the services described in this documentation, as scribed by Indio Banks in my presence, and it is both accurate and complete. C    The note accurately reflects work and decisions made by me.  Wojciech Burton  9/25/2018  6:52 PM

## 2018-09-25 NOTE — PATIENT INSTRUCTIONS
Hipertensión  (Hypertension)  La hipertensión, conocida comúnmente agnieszka presión arterial amol, se produce cuando la brii bombea en las arterias con mucha fuerza. Las arterias son los vasos sanguíneos que transportan la brii desde el corazón hacia todas las partes del cuerpo. Saniya lectura de la presión arterial consiste en un número más alto sobre un número más bajo, por ejemplo, 110/72. El número más alto (presión sistólica) corresponde a la presión interna de las arterias cuando el corazón bombea brii. El número más bajo (presión diastólica) corresponde a la presión interna de las arterias cuando el corazón se relaja. En condiciones ideales, la presión arterial debe ser inferior a 120/80.  La hipertensión fuerza al corazón a trabajar más para bombear la brii. Las arterias pueden estrecharse o ponerse rígidas. La hipertensión no tratada o no controlada puede causar infarto de miocardio, ictus, enfermedad renal y otros problemas.  FACTORES DE RIESGO  Algunos factores de riesgo de hipertensión son controlables, bita otros no lo son.  Entre los factores de riesgo que usted no puede controlar, se incluyen los siguientes:  · La deep. El riesgo es mayor para las personas afroamericanas.  · La edad. Los riesgos aumentan con la edad.  · El sexo. Antes de los 45 años, los hombres corren más riesgo que las mujeres. Después de los 65 años, las mujeres corren más riesgo que los hombres.  Entre los factores de riesgo que usted puede controlar, se incluyen los siguientes:  · No hacer la cantidad suficiente de actividad física o ejercicio.  · Tener sobrepeso.  · Consumir mucha grasa, azúcar, calorías o sal en la dieta.  · Beber alcohol en exceso.  SIGNOS Y SÍNTOMAS  Por lo general, la hipertensión no causa signos o síntomas. La hipertensión arterial demasiado amol (crisis hipertensiva) puede causar dolor de oseas, ansiedad, falta de aire y hemorragia nasal.  DIAGNÓSTICO  Para detectar si usted tiene hipertensión, el médico  le medirá la presión arterial mientras esté sentado, con el brazo levantado a la altura del corazón. Debe medirla al menos dos veces en el mismo brazo. Determinadas condiciones pueden causar christina diferencia de presión arterial entre el brazo naif y el derecho. El hecho de tener christina rikki lectura de la presión arterial más amol que lo normal no significa que necesita un tratamiento. Si no está azucena si tiene hipertensión arterial, es posible que se le pida que regrese otro día para volver a controlarle la presión arterial. O el se le puede pedir que se controle la presión arterial en borges casa tammi 1 o más meses.  TRATAMIENTO  El tratamiento de la hipertensión arterial incluye hacer cambios en el estilo de jeffry y, posiblemente, johana medicamentos. Un estilo de jeffry saludable puede ayudar a bajar la presión arterial amol. Quizá deba cambiar algunos hábitos.  Los cambios en el estilo de jeffry pueden incluir lo siguiente:  · Seguir la dieta DASH. Esta dieta tiene un alto contenido de frutas, verduras y cereales integrales. Incluye poca cantidad de sal, danny reyes y azúcares agregados.  · Mantenga el consumo de sodio por debajo de 2 300 mg por día.  · Realizar al menos entre 30 y 45 minutos de ejercicio aeróbico, 4 veces por semana agnieszka mínimo.  · Perder peso, si es necesario.  · No fumar.  · Limitar el consumo de bebidas alcohólicas.  · Aprender formas de reducir el estrés.  El médico puede recetarle medicamentos si los cambios en el estilo de jeffry no son suficientes para lograr controlar la presión arterial y si christina de las siguientes afirmaciones es verdadera:  · Tiene entre 18 y 59 años y borges presión arterial sistólica está por encima de 140.  · Tiene 60 años o más y borges presión arterial sistólica está por encima de 150.  · Borges presión arterial diastólica está por encima de 90.  · Tiene diabetes y borges presión arterial sistólica está por encima de 140 o borges presión arterial diastólica está por encima de 90.  · Tiene  christina enfermedad renal y vicente presión arterial está por encima de 140/90.  · Tiene christina enfermedad cardíaca y vicente presión arterial está por encima de 140/90.  La presión arterial deseada puede variar en función de las enfermedades, la edad y otros factores personales.  INSTRUCCIONES PARA EL CUIDADO EN EL HOGAR  · Raquel que le midan de nuevo la presión arterial según las indicaciones del médico.  · [place] los medicamentos solamente agnieszka se lo haya indicado el médico. Siga cuidadosamente las indicaciones. Los medicamentos para la presión arterial deben tomarse según las indicaciones. Los medicamentos pierden eficacia al omitir las dosis. El hecho de omitir las dosis también aumenta el riesgo de otros problemas.  · No fume.  · Contrólese la presión arterial en vicente casa según las indicaciones del médico.  SOLICITE ATENCIÓN MÉDICA SI:  · Piensa que tiene christina reacción alérgica a los medicamentos.  · Tiene mareos o alicia de oseas con recurrencia.  · Tiene hinchazón en los tobillos.  · Tiene problemas de visión.  SOLICITE ATENCIÓN MÉDICA DE INMEDIATO SI:  · Siente un dolor de oseas intenso o confusión.  · Siente debilidad inusual, adormecimiento o que se desmayará.  · Siente dolor intenso en el pecho o en el abdomen.  · Vomita repetidas veces.  · Tiene dificultad para respirar.  ASEGÚRESE DE QUE:  · Comprende estas instrucciones.  · Controlará vicente afección.  · Recibirá ayuda de inmediato si no mejora o si empeora.  Esta información no tiene agnieszka fin reemplazar el consejo del médico. Asegúrese de hacerle al médico cualquier pregunta que tenga.  Document Released: 12/18/2006 Document Revised: 05/03/2016 Document Reviewed: 10/10/2014  [employer] Interactive Patient Education © 2017 [employer]

## 2018-09-26 NOTE — PROGRESS NOTES
"Subjective:      Pt is a 77 y.o. male who presents with Rectal Pain (pt states he was seen at the ER for abdominal pain and states he had a CT but it was negative)            HPI  Pt was seen 11 days ago at the ER for a \"burning LLQ abd pain\" which \"radiaites everywhere\" in his abd. A CT abd was NEG with noted diverticulosis but not diverticulitis and he denies GERD and the pt notes and he has been taking NORCO for the last week with mild effect he states. Pt notes the abd pain x 10-14 days. Pt also notes mild headache and his son, who is with him today, states that the story from his father changes and seems inconsistent. Pt notes mild headaches as well. Pt has not taken any Rx medications for this condition. Pt states the pain is a 7/10, aching in nature and worse at night. Pt denies CP, SOB, NVD, paresthesias, headaches, dizziness, change in vision, hives, or other joint pain. The pt's medication list, problem list, and allergies have been evaluated and reviewed during today's visit.    PMH:  Past Medical History:   Diagnosis Date   • Gout    • Hypertension        PSH:  Negative per pt.      Fam Hx:  the patient's family history is not pertinent to their current complaint    Soc HX:  Social History     Social History   • Marital status:      Spouse name: N/A   • Number of children: N/A   • Years of education: N/A     Occupational History   • Not on file.     Social History Main Topics   • Smoking status: Never Smoker   • Smokeless tobacco: Never Used   • Alcohol use No   • Drug use: No   • Sexual activity: Not on file     Other Topics Concern   • Not on file     Social History Narrative   • No narrative on file         Medications:    Current Outpatient Prescriptions:   •  HYDROcodone-acetaminophen (NORCO) 5-325 MG Tab per tablet, Take 1-2 Tabs by mouth every four hours as needed., Disp: , Rfl:   •  lisinopril (PRINIVIL) 10 MG Tab, Take 1 Tab by mouth every day., Disp: 30 Tab, Rfl: 3  •  allopurinol " "(ZYLOPRIM) 100 MG Tab, Take 100 mg by mouth every day., Disp: , Rfl:   •  polyethylene glycol 3350 (MIRALAX) Powder, Take 17 g by mouth every day. Mixed in 1 cup water, Disp: 1 Bottle, Rfl: 0      Allergies:  Patient has no known allergies.    ROS  Constitutional: Negative for fever, chills and malaise/fatigue.   HENT: Negative for congestion and sore throat.    Eyes: Negative for blurred vision, double vision and photophobia.   Respiratory: Negative for cough and shortness of breath.  Cardiovascular: Negative for chest pain and palpitations.   Gastrointestinal: POS for LLQ abd pain and NEG for heartburn, nausea, vomiting,  diarrhea and constipation.   Genitourinary: Negative for dysuria and flank pain.   Musculoskeletal: Negative for joint pain and myalgias.   Skin: Negative for itching and rash.   Neurological: Negative for dizziness, tingling and headaches.   Endo/Heme/Allergies: Does not bruise/bleed easily.   Psychiatric/Behavioral: Negative for depression. The patient is not nervous/anxious.           Objective:     BP (!) 202/100 (BP Location: Right arm, Patient Position: Sitting, BP Cuff Size: Adult)   Pulse 63   Temp 36.9 °C (98.5 °F) (Temporal)   Resp 16   Ht 1.626 m (5' 4\")   Wt 68 kg (150 lb)   SpO2 96%   BMI 25.75 kg/m²      Physical Exam   Abdominal: Soft. Normal appearance and bowel sounds are normal. He exhibits no shifting dullness, no distension, no pulsatile liver, no fluid wave, no abdominal bruit, no ascites, no pulsatile midline mass and no mass. There is no hepatosplenomegaly. There is generalized tenderness and tenderness in the left lower quadrant. There is no rigidity, no rebound, no guarding, no CVA tenderness, no tenderness at McBurney's point and negative Lopez's sign. No hernia.             Constitutional: PT is oriented to person, place, and time. PT appears well-developed and well-nourished. No distress.   HENT:   Head: Normocephalic and atraumatic.   Mouth/Throat: Oropharynx " is clear and moist. No oropharyngeal exudate.   Eyes: Conjunctivae normal and EOM are normal. Pupils are equal, round, and reactive to light.   Neck: Normal range of motion. Neck supple. No thyromegaly present.   Cardiovascular: Normal rate, regular rhythm, normal heart sounds and intact distal pulses.  Exam reveals no gallop and no friction rub.    No murmur heard.  Pulmonary/Chest: Effort normal and breath sounds normal. No respiratory distress. PT has no wheezes. PT has no rales. Pt exhibits no tenderness.   Musculoskeletal: Normal range of motion. PT exhibits no edema and no tenderness.   Neurological: PT is alert and oriented to person, place, and time. PT has normal reflexes. No cranial nerve deficit.   Skin: Skin is warm and dry. No rash noted. PT is not diaphoretic. No erythema.       Psychiatric: PT has a normal mood and affect. PT behavior is normal. Judgment and thought content normal.          Assessment/Plan:     1. Malignant hypertension-->202/100 in clinic      2. LLQ abdominal pain    Concern for malignant HTN uncontrolled with symptomatic headaches. Pt to go to a higher level of triage.  TO [facility] ER NOW for further evaluation. Spoke with Dr. Diaz on the phone, attending at the ER , and she graciously accepted transfer of care for further imaging and evaluation of the pt. Reiterated to patient that although a provider to provider transfer was made this will not necessarily expedite the ER process.  Pt to go POV to ER with son driving  Rest, fluids encouraged.  AVS with medical info given.  Pt was in full understanding and agreement with the plan.  Follow-up as needed if symptoms worsen or fail to improve.

## 2021-01-14 DIAGNOSIS — Z23 NEED FOR VACCINATION: ICD-10-CM

## 2021-12-09 NOTE — ED TRIAGE NOTES
.  Chief Complaint   Patient presents with   • LLQ Pain     radiates upward; pt seen for same on 9/14, states he doesn't know what he was diagnosed with, diverticulosis seen on last visit   • Groin Pain     L sided     Patient ambulatory to triage for above complaints; A&O X4; NAD observed in triage. Pt has family at the bedside translating, Sami speaking only. PT given urine cup w/ instructions. Elevated BP noted.   Patient placed in senior lounge and educated to notify staff of new or worsening symptoms.     
Pt ambulatory to [facility].  Agree with triage assessment; patient primarily Citizen of [place] speaking, declined use of , request to use [place] childs at bedside.  Pt changed into gown.  Chart up for ERP.      
64.9